# Patient Record
Sex: MALE | Race: WHITE | NOT HISPANIC OR LATINO | Employment: STUDENT | URBAN - NONMETROPOLITAN AREA
[De-identification: names, ages, dates, MRNs, and addresses within clinical notes are randomized per-mention and may not be internally consistent; named-entity substitution may affect disease eponyms.]

---

## 2024-07-22 ENCOUNTER — HOSPITAL ENCOUNTER (EMERGENCY)
Facility: HOSPITAL | Age: 19
Discharge: HOME | End: 2024-07-23
Attending: STUDENT IN AN ORGANIZED HEALTH CARE EDUCATION/TRAINING PROGRAM
Payer: MEDICAID

## 2024-07-22 DIAGNOSIS — R21 RASH AND NONSPECIFIC SKIN ERUPTION: Primary | ICD-10-CM

## 2024-07-22 PROCEDURE — 99283 EMERGENCY DEPT VISIT LOW MDM: CPT

## 2024-07-22 PROCEDURE — 36415 COLL VENOUS BLD VENIPUNCTURE: CPT

## 2024-07-23 VITALS
RESPIRATION RATE: 18 BRPM | HEIGHT: 74 IN | OXYGEN SATURATION: 99 % | DIASTOLIC BLOOD PRESSURE: 77 MMHG | HEART RATE: 65 BPM | SYSTOLIC BLOOD PRESSURE: 125 MMHG | WEIGHT: 125 LBS | BODY MASS INDEX: 16.04 KG/M2 | TEMPERATURE: 97.9 F

## 2024-07-23 LAB
BASOPHILS # BLD AUTO: 0.06 10*3/MM3 (ref 0–0.2)
BASOPHILS NFR BLD AUTO: 1 % (ref 0–1.5)
DEPRECATED RDW RBC AUTO: 35.7 FL (ref 37–54)
EOSINOPHIL # BLD AUTO: 0.37 10*3/MM3 (ref 0–0.4)
EOSINOPHIL NFR BLD AUTO: 6 % (ref 0.3–6.2)
ERYTHROCYTE [DISTWIDTH] IN BLOOD BY AUTOMATED COUNT: 11.4 % (ref 12.3–15.4)
HCT VFR BLD AUTO: 40.5 % (ref 37.5–51)
HGB BLD-MCNC: 14.6 G/DL (ref 13–17.7)
IMM GRANULOCYTES # BLD AUTO: 0.01 10*3/MM3 (ref 0–0.05)
IMM GRANULOCYTES NFR BLD AUTO: 0.2 % (ref 0–0.5)
LYMPHOCYTES # BLD AUTO: 1.87 10*3/MM3 (ref 0.7–3.1)
LYMPHOCYTES NFR BLD AUTO: 30.5 % (ref 19.6–45.3)
MCH RBC QN AUTO: 30.9 PG (ref 26.6–33)
MCHC RBC AUTO-ENTMCNC: 36 G/DL (ref 31.5–35.7)
MCV RBC AUTO: 85.6 FL (ref 79–97)
MONOCYTES # BLD AUTO: 0.48 10*3/MM3 (ref 0.1–0.9)
MONOCYTES NFR BLD AUTO: 7.8 % (ref 5–12)
NEUTROPHILS NFR BLD AUTO: 3.34 10*3/MM3 (ref 1.7–7)
NEUTROPHILS NFR BLD AUTO: 54.5 % (ref 42.7–76)
NRBC BLD AUTO-RTO: 0 /100 WBC (ref 0–0.2)
PLATELET # BLD AUTO: 224 10*3/MM3 (ref 140–450)
PMV BLD AUTO: 8.9 FL (ref 6–12)
RBC # BLD AUTO: 4.73 10*6/MM3 (ref 4.14–5.8)
WBC NRBC COR # BLD AUTO: 6.13 10*3/MM3 (ref 3.4–10.8)

## 2024-07-23 PROCEDURE — 85025 COMPLETE CBC W/AUTO DIFF WBC: CPT

## 2024-07-23 PROCEDURE — 36415 COLL VENOUS BLD VENIPUNCTURE: CPT

## 2024-07-23 RX ORDER — VALACYCLOVIR HYDROCHLORIDE 1 G/1
1000 TABLET, FILM COATED ORAL 3 TIMES DAILY
Qty: 21 TABLET | Refills: 0 | Status: SHIPPED | OUTPATIENT
Start: 2024-07-23 | End: 2024-07-30

## 2024-07-23 RX ORDER — TRIAMCINOLONE ACETONIDE 1 MG/G
1 CREAM TOPICAL 2 TIMES DAILY
Qty: 14 G | Refills: 0 | Status: SHIPPED | OUTPATIENT
Start: 2024-07-23 | End: 2024-07-30

## 2024-07-23 RX ORDER — VALACYCLOVIR HYDROCHLORIDE 500 MG/1
1000 TABLET, FILM COATED ORAL ONCE
Status: COMPLETED | OUTPATIENT
Start: 2024-07-23 | End: 2024-07-23

## 2024-07-23 RX ADMIN — VALACYCLOVIR HYDROCHLORIDE 1000 MG: 500 TABLET, FILM COATED ORAL at 00:43

## 2024-07-23 NOTE — DISCHARGE INSTRUCTIONS
Take your Valtrex medication 3 times daily for the next 1 week.  Recommend following up with a dermatologist soon as possible if symptoms not resolve within around 1 week or if they worsen.

## 2024-07-23 NOTE — ED PROVIDER NOTES
I evaluated this patient in conjunction with the SUKHWINDER.  I personally performed a history and physical examination.  I agree with SUKHWINDER's documented history, physical and medical decision making.    On my examination patient has about a 3 x 3 cm area of maculopapular rash to the medial right calf.  Patient has strong bilateral dorsalis pedis pulses.  There is no evidence of rash anywhere else throughout the body.  There is no underlying palpable fluctuance or induration.     Mando Grajeda MD  07/23/24 0037

## 2024-07-23 NOTE — ED PROVIDER NOTES
EMERGENCY DEPARTMENT ENCOUNTER    Pt Name: Jayesh Mason  MRN: [id number]  Pt :   2005  Room Number:  01SF01  Date of encounter:  2024  PCP: Provider, No Known  ED Provider: Juan El PA-C    Historian: Patient, nursing notes      HPI:  Chief Complaint: Rash        Context: Jayesh Mason is a 19 y.o. male who presents to the ED c/o rash on his left lower extremity for the past 4 days.  Patient reports a small area on his left inner lower leg has slowly grown into a large patch that is pruritic in nature.  Patient denies any pain but states the rash is itchy.  Patient denies any fever or chills, redness of the skin, numbness, tingling or any other complaint.      PAST MEDICAL HISTORY  History reviewed. No pertinent past medical history.      PAST SURGICAL HISTORY  History reviewed. No pertinent surgical history.      FAMILY HISTORY  History reviewed. No pertinent family history.      SOCIAL HISTORY  Social History     Socioeconomic History    Marital status: Single   Tobacco Use    Smoking status: Never    Smokeless tobacco: Never   Vaping Use    Vaping status: Never Used   Substance and Sexual Activity    Alcohol use: Never    Drug use: Never    Sexual activity: Defer         ALLERGIES  Patient has no known allergies.        REVIEW OF SYSTEMS  Review of Systems   Constitutional:  Negative for chills and fever.   HENT:  Negative for congestion and sore throat.    Respiratory:  Negative for cough and shortness of breath.    Cardiovascular:  Negative for chest pain.   Gastrointestinal:  Negative for abdominal pain, nausea and vomiting.   Genitourinary:  Negative for dysuria.   Musculoskeletal:  Negative for back pain.   Skin:  Positive for rash. Negative for wound.   Neurological:  Negative for dizziness and headaches.   Psychiatric/Behavioral:  Negative for confusion.    All other systems reviewed and are negative.         All systems reviewed and negative except for those  discussed in HPI.       PHYSICAL EXAM    I have reviewed the triage vital signs and nursing notes.    ED Triage Vitals [07/22/24 2254]   Temp Heart Rate Resp BP SpO2   97.9 °F (36.6 °C) 84 16 108/83 97 %      Temp src Heart Rate Source Patient Position BP Location FiO2 (%)   Oral Monitor Sitting Left arm --       Physical Exam  Vitals and nursing note reviewed.   Constitutional:       General: He is not in acute distress.     Appearance: Normal appearance. He is not ill-appearing, toxic-appearing or diaphoretic.   HENT:      Head: Normocephalic and atraumatic.      Right Ear: External ear normal.      Left Ear: External ear normal.      Nose: No congestion or rhinorrhea.      Mouth/Throat:      Mouth: Mucous membranes are moist.      Pharynx: Oropharynx is clear.   Eyes:      Conjunctiva/sclera: Conjunctivae normal.   Cardiovascular:      Rate and Rhythm: Normal rate.      Heart sounds: Normal heart sounds.   Pulmonary:      Effort: Pulmonary effort is normal. No respiratory distress.      Breath sounds: Normal breath sounds. No wheezing.   Abdominal:      Tenderness: There is no abdominal tenderness.   Musculoskeletal:      Cervical back: Normal range of motion and neck supple.      Right lower leg: No edema.      Left lower leg: No edema.   Skin:     Findings: Petechiae present. No rash. Rash is papular and pustular.   Neurological:      Mental Status: He is alert.             LAB RESULTS  Recent Results (from the past 24 hour(s))   CBC Auto Differential    Collection Time: 07/23/24 12:13 AM    Specimen: Blood   Result Value Ref Range    WBC 6.13 3.40 - 10.80 10*3/mm3    RBC 4.73 4.14 - 5.80 10*6/mm3    Hemoglobin 14.6 13.0 - 17.7 g/dL    Hematocrit 40.5 37.5 - 51.0 %    MCV 85.6 79.0 - 97.0 fL    MCH 30.9 26.6 - 33.0 pg    MCHC 36.0 (H) 31.5 - 35.7 g/dL    RDW 11.4 (L) 12.3 - 15.4 %    RDW-SD 35.7 (L) 37.0 - 54.0 fl    MPV 8.9 6.0 - 12.0 fL    Platelets 224 140 - 450 10*3/mm3    Neutrophil % 54.5 42.7 - 76.0 %     Lymphocyte % 30.5 19.6 - 45.3 %    Monocyte % 7.8 5.0 - 12.0 %    Eosinophil % 6.0 0.3 - 6.2 %    Basophil % 1.0 0.0 - 1.5 %    Immature Grans % 0.2 0.0 - 0.5 %    Neutrophils, Absolute 3.34 1.70 - 7.00 10*3/mm3    Lymphocytes, Absolute 1.87 0.70 - 3.10 10*3/mm3    Monocytes, Absolute 0.48 0.10 - 0.90 10*3/mm3    Eosinophils, Absolute 0.37 0.00 - 0.40 10*3/mm3    Basophils, Absolute 0.06 0.00 - 0.20 10*3/mm3    Immature Grans, Absolute 0.01 0.00 - 0.05 10*3/mm3    nRBC 0.0 0.0 - 0.2 /100 WBC       If labs were ordered, I independently reviewed the results and considered them in treating the patient.        RADIOLOGY  No Radiology Exams Resulted Within Past 24 Hours      PROCEDURES    Procedures    No orders to display       MEDICATIONS GIVEN IN ER    Medications   valACYclovir (VALTREX) tablet 1,000 mg (has no administration in time range)         MEDICAL DECISION MAKING, PROGRESS, and CONSULTS    All labs, if obtained, have been independently reviewed by me.  All radiology studies, if obtained, have been reviewed by me and the radiologist dictating the report.  All EKG's, if obtained, have been independently viewed and interpreted by me/my attending physician.      Discussion below represents my analysis of pertinent findings related to patient's condition, differential diagnosis, treatment plan and final disposition.    19-year-old male presenting for rash on his inner left lower leg.  On exam there is a approximately 3 x 3 cm area of a primarily maculopapular rash with some petechiae.  Given concern for the petechial nature, a CBC was ordered to rule out ITP.  CBC was largely unremarkable with normal platelets and no other acute findings.  I suspect that the rash is benign and the patient is safe for discharge.  To cover against possible shingles, will prescribe Valtrex at discharge and recommend close follow-up with dermatology if symptoms do not resolve.                     Differential  diagnosis:    Differential diagnosis included but was not limited to purpura, contact dermatitis, cellulitis, insect bite, shingles      Additional sources:    - Discussed/ obtained information from independent historians: None    - External (non-ED) record review: None    - Chronic or social conditions impacting care: None    Orders placed during this visit:  Orders Placed This Encounter   Procedures    CBC Auto Differential    Ambulatory Referral to Dermatology    CBC & Differential         Additional orders considered but not ordered: None      ED Course:    Consultants: None                Shared Decision Making:  After my consideration of clinical presentation and any laboratory/radiology studies obtained, I discussed the findings with the patient/patient representative who is in agreement with the treatment plan and the final disposition.   Risks and benefits of discharge and/or observation/admission were discussed.       AS OF 00:30 EDT VITALS:    BP - 108/83  HR - 84  TEMP - 97.9 °F (36.6 °C) (Oral)  O2 SATS - 97%                  DIAGNOSIS  Final diagnoses:   Rash and nonspecific skin eruption         DISPOSITION  Discharge home      Please note that portions of this document were completed with voice recognition software.      Juan El PA-C  07/23/24 0030